# Patient Record
Sex: FEMALE | Race: WHITE | NOT HISPANIC OR LATINO | URBAN - METROPOLITAN AREA
[De-identification: names, ages, dates, MRNs, and addresses within clinical notes are randomized per-mention and may not be internally consistent; named-entity substitution may affect disease eponyms.]

---

## 2018-06-14 ENCOUNTER — OFFICE VISIT (OUTPATIENT)
Dept: OPHTHALMOLOGY | Facility: CLINIC | Age: 18
End: 2018-06-14
Payer: COMMERCIAL

## 2018-06-14 DIAGNOSIS — H52.203 MYOPIA OF BOTH EYES WITH ASTIGMATISM: ICD-10-CM

## 2018-06-14 DIAGNOSIS — H52.13 MYOPIA OF BOTH EYES WITH ASTIGMATISM: ICD-10-CM

## 2018-06-14 DIAGNOSIS — Z13.5 GLAUCOMA SCREENING: ICD-10-CM

## 2018-06-14 DIAGNOSIS — Z01.00 ENCOUNTER FOR EXAMINATION OF EYES AND VISION WITHOUT ABNORMAL FINDINGS: Primary | ICD-10-CM

## 2018-06-14 PROBLEM — I95.9 HYPOTENSION: Status: ACTIVE | Noted: 2018-03-24

## 2018-06-14 PROBLEM — D50.9 MICROCYTIC ANEMIA: Status: ACTIVE | Noted: 2018-03-24

## 2018-06-14 PROBLEM — F90.9 ADHD: Status: ACTIVE | Noted: 2018-03-24

## 2018-06-14 PROBLEM — J45.909 ASTHMA: Status: ACTIVE | Noted: 2018-03-24

## 2018-06-14 PROCEDURE — 92015 DETERMINE REFRACTIVE STATE: CPT | Mod: S$GLB,,, | Performed by: OPTOMETRIST

## 2018-06-14 PROCEDURE — 99202 OFFICE O/P NEW SF 15 MIN: CPT | Mod: PBBFAC,PO | Performed by: OPTOMETRIST

## 2018-06-14 PROCEDURE — 92004 COMPRE OPH EXAM NEW PT 1/>: CPT | Mod: S$GLB,,, | Performed by: OPTOMETRIST

## 2018-06-14 PROCEDURE — 99999 PR PBB SHADOW E&M-NEW PATIENT-LVL II: CPT | Mod: PBBFAC,,, | Performed by: OPTOMETRIST

## 2018-06-14 RX ORDER — LISDEXAMFETAMINE DIMESYLATE 30 MG/1
CAPSULE ORAL
COMMUNITY

## 2018-06-14 RX ORDER — MONTELUKAST SODIUM 10 MG/1
TABLET ORAL
COMMUNITY

## 2018-06-14 NOTE — PROGRESS NOTES
HPI     Eye Exam    Additional comments: Yearly           Contact Lens Fit    Additional comments: Yearly           Comments   NP to [facility].  No noticeable changes in vision since last eye exam.   Wears SVL glasses or CTL last updated 1 year ago  Patient wants to updated CTL and glasses RX  Patient has been wearing CTL for about 5 years. Sleeps in CTL cleans CTL   once a week and changes them every 30 days.  No other complaints  No drops       Last edited by Sahra Lane, PCT on 6/14/2018  8:02 AM. (History)              Assessment /Plan     For exam results, see Encounter Report.    Encounter for examination of eyes and vision without abnormal findings    Glaucoma screening    Myopia of both eyes with astigmatism      Glaucoma screening and fundus exam normal.  Updated glasses and contact lens prescriptions.  Return to clinic 1 yr.

## 2020-06-16 ENCOUNTER — LAB VISIT (OUTPATIENT)
Dept: PRIMARY CARE CLINIC | Facility: OTHER | Age: 20
End: 2020-06-16
Payer: OTHER GOVERNMENT

## 2020-06-16 DIAGNOSIS — Z11.59 SCREENING EXAMINATION FOR POLIOMYELITIS: Primary | ICD-10-CM

## 2020-06-16 PROCEDURE — U0003 INFECTIOUS AGENT DETECTION BY NUCLEIC ACID (DNA OR RNA); SEVERE ACUTE RESPIRATORY SYNDROME CORONAVIRUS 2 (SARS-COV-2) (CORONAVIRUS DISEASE [COVID-19]), AMPLIFIED PROBE TECHNIQUE, MAKING USE OF HIGH THROUGHPUT TECHNOLOGIES AS DESCRIBED BY CMS-2020-01-R: HCPCS

## 2020-06-19 LAB — SARS-COV-2 RNA RESP QL NAA+PROBE: NOT DETECTED
